# Patient Record
Sex: MALE | Race: WHITE | NOT HISPANIC OR LATINO | Employment: OTHER | URBAN - METROPOLITAN AREA
[De-identification: names, ages, dates, MRNs, and addresses within clinical notes are randomized per-mention and may not be internally consistent; named-entity substitution may affect disease eponyms.]

---

## 2020-02-25 ENCOUNTER — TRANSCRIBE ORDERS (OUTPATIENT)
Dept: ADMINISTRATIVE | Facility: HOSPITAL | Age: 85
End: 2020-02-25

## 2020-02-25 DIAGNOSIS — C61 PROSTATE CA (HCC): Primary | ICD-10-CM

## 2020-02-27 ENCOUNTER — TRANSCRIBE ORDERS (OUTPATIENT)
Dept: ADMINISTRATIVE | Facility: HOSPITAL | Age: 85
End: 2020-02-27

## 2020-02-27 DIAGNOSIS — C79.52 SECONDARY MALIGNANT NEOPLASM OF BONE AND BONE MARROW (HCC): Primary | ICD-10-CM

## 2020-02-27 DIAGNOSIS — C77.9 LYMPH NODE CANCER (HCC): ICD-10-CM

## 2020-02-27 DIAGNOSIS — C79.51 SECONDARY MALIGNANT NEOPLASM OF BONE AND BONE MARROW (HCC): Primary | ICD-10-CM

## 2020-02-27 DIAGNOSIS — M54.9 BACK PAIN, UNSPECIFIED BACK LOCATION, UNSPECIFIED BACK PAIN LATERALITY, UNSPECIFIED CHRONICITY: ICD-10-CM

## 2020-03-12 ENCOUNTER — APPOINTMENT (OUTPATIENT)
Dept: NUCLEAR MEDICINE | Facility: HOSPITAL | Age: 85
End: 2020-03-12

## 2020-03-12 ENCOUNTER — APPOINTMENT (OUTPATIENT)
Dept: CT IMAGING | Facility: HOSPITAL | Age: 85
End: 2020-03-12

## 2020-03-12 ENCOUNTER — HOSPITAL ENCOUNTER (OUTPATIENT)
Dept: PET IMAGING | Facility: HOSPITAL | Age: 85
End: 2020-03-12

## 2020-06-10 ENCOUNTER — HOSPITAL ENCOUNTER (OUTPATIENT)
Dept: NUCLEAR MEDICINE | Facility: HOSPITAL | Age: 85
Discharge: HOME | End: 2020-06-10

## 2020-06-10 ENCOUNTER — HOSPITAL ENCOUNTER (OUTPATIENT)
Dept: CT IMAGING | Facility: HOSPITAL | Age: 85
Discharge: HOME | End: 2020-06-10
Admitting: NURSE PRACTITIONER

## 2020-06-10 DIAGNOSIS — C79.52 SECONDARY MALIGNANT NEOPLASM OF BONE AND BONE MARROW (HCC): ICD-10-CM

## 2020-06-10 DIAGNOSIS — C79.51 SECONDARY MALIGNANT NEOPLASM OF BONE AND BONE MARROW (HCC): ICD-10-CM

## 2020-06-10 DIAGNOSIS — C77.9 LYMPH NODE CANCER (HCC): ICD-10-CM

## 2020-06-10 DIAGNOSIS — M54.9 BACK PAIN, UNSPECIFIED BACK LOCATION, UNSPECIFIED BACK PAIN LATERALITY, UNSPECIFIED CHRONICITY: ICD-10-CM

## 2020-06-10 LAB — CREAT BLDA-MCNC: 1.2 MG/DL (ref 0.6–1.3)

## 2020-06-10 PROCEDURE — 71260 CT THORAX DX C+: CPT

## 2020-06-10 PROCEDURE — 0 DIATRIZOATE MEGLUMINE & SODIUM PER 1 ML: Performed by: NURSE PRACTITIONER

## 2020-06-10 PROCEDURE — 78306 BONE IMAGING WHOLE BODY: CPT

## 2020-06-10 PROCEDURE — A9503 TC99M MEDRONATE: HCPCS | Performed by: NURSE PRACTITIONER

## 2020-06-10 PROCEDURE — 82565 ASSAY OF CREATININE: CPT

## 2020-06-10 PROCEDURE — 74177 CT ABD & PELVIS W/CONTRAST: CPT

## 2020-06-10 PROCEDURE — 0 TECHNETIUM MEDRONATE KIT: Performed by: NURSE PRACTITIONER

## 2020-06-10 PROCEDURE — 25010000002 IOPAMIDOL 61 % SOLUTION: Performed by: NURSE PRACTITIONER

## 2020-06-10 RX ORDER — TC 99M MEDRONATE 20 MG/10ML
22.1 INJECTION, POWDER, LYOPHILIZED, FOR SOLUTION INTRAVENOUS
Status: COMPLETED | OUTPATIENT
Start: 2020-06-10 | End: 2020-06-10

## 2020-06-10 RX ADMIN — DIATRIZOATE MEGLUMINE AND DIATRIZOATE SODIUM 30 ML: 600; 100 SOLUTION ORAL; RECTAL at 10:31

## 2020-06-10 RX ADMIN — Medication 22.1 MILLICURIE: at 10:00

## 2020-06-10 RX ADMIN — IOPAMIDOL 85 ML: 612 INJECTION, SOLUTION INTRAVENOUS at 11:30

## 2020-06-17 ENCOUNTER — HOSPITAL ENCOUNTER (EMERGENCY)
Facility: HOSPITAL | Age: 85
Discharge: HOME | End: 2020-06-17
Attending: EMERGENCY MEDICINE | Admitting: EMERGENCY MEDICINE

## 2020-06-17 VITALS
WEIGHT: 140 LBS | RESPIRATION RATE: 18 BRPM | TEMPERATURE: 96.7 F | HEART RATE: 69 BPM | HEIGHT: 64 IN | OXYGEN SATURATION: 97 % | BODY MASS INDEX: 23.9 KG/M2

## 2020-06-17 DIAGNOSIS — I10 HYPERTENSION NOT AT GOAL: Primary | ICD-10-CM

## 2020-06-17 PROCEDURE — 99282 EMERGENCY DEPT VISIT SF MDM: CPT

## 2020-06-17 NOTE — ED PROVIDER NOTES
EMERGENCY DEPARTMENT ENCOUNTER    Room Number:  17/17  Date seen:  6/17/2020  Time seen: 11:20 AM  PCP: Provider, No Known  Historian: patient      HPI:  Chief Complaint: elevated temperature reading    A complete HPI/ROS/PMH/PSH/SH/FH are unobtainable due to: none    Context: Luis Antonio Valdivia is a 89 y.o. male who presents to the ED for evaluation of a one-time elevated temperature reading.  This was 1 time just prior to arrival, mild,  when he had an appointment with his urologist to follow-up on some outpatient scans he had done.  He was sitting in his hot car waiting for staff to come out in triage him for his appointment due to the COVID-19 pandemic.  When he checked his temperature it was 100.0.  They swabbed him for COVID-19 and sent him to the ER for further evaluation.  He believes sitting in the hot care made his temperature higher, and sitting in the a/c since has allowed it to come back down.    He denies having any symptoms at all.  He feels great and at baseline.  He feels frustrated that he is been sent to the ER and would like to be discharged.  He does state that he was told he had a nonspecific lesion on his chest CT from a few days ago.  He was told it did not look infectious.  He has had no cough, fever, chills, nausea, vomiting, diarrhea, shortness of breath, abdominal pain or urinary symptoms.  He also denies anosmia, dysgeusia, and any known exposure to COVID 19.        PAST MEDICAL HISTORY  Active Ambulatory Problems     Diagnosis Date Noted   • No Active Ambulatory Problems     Resolved Ambulatory Problems     Diagnosis Date Noted   • No Resolved Ambulatory Problems     No Additional Past Medical History         PAST SURGICAL HISTORY  No past surgical history on file.      FAMILY HISTORY  No family history on file.      SOCIAL HISTORY  Social History     Socioeconomic History   • Marital status:      Spouse name: Not on file   • Number of children: Not on file   • Years of education:  Not on file   • Highest education level: Not on file         ALLERGIES  Patient has no known allergies.        REVIEW OF SYSTEMS  Review of Systems     All systems reviewed and negative except for those discussed in HPI.       PHYSICAL EXAM  ED Triage Vitals [06/17/20 1039]   Temp Heart Rate Resp BP SpO2   96.7 °F (35.9 °C) 69 18 -- 97 %      Temp src Heart Rate Source Patient Position BP Location FiO2 (%)   Tympanic Monitor -- -- --         GENERAL: well-appearing, not distressed  HENT: atraumatic  EYES: no scleral icterus  CV: regular rhythm, regular rate  RESPIRATORY: normal effort  ABDOMEN: soft, nontender  MUSCULOSKELETAL: no deformity  NEURO: alert, moves all extremities, follows commands  SKIN: warm, dry    Vital signs and nursing notes reviewed.            PROGRESS AND CONSULTS    DDX includes but not limited to temp elevation due to environment, covid 19, pneumonia, UTI,     ED Course as of Jun 17 2014 Wed Jun 17, 2020   1142 The patient is alert, oriented x3.  He is of sound mental capacity.  He feels his temperature was elevated simply because he was sitting in the hot car and it has come down since being in the conditioning.  He denies any infectious symptoms.  I have offered to contact the urology office because his scans are not available in epic.  He does not want to wait for his scan reports to be faxed over.  I have offered blood work, imaging of his chest and a urinalysis to further screen him for infection.  He declines.  He is asymptomatic and has no complaints and his temperature has normalized.  He would like to be discharged at this time.  I feel this is okay. He will return if he develops any symptoms.   His blood pressure is elevated however he forgot to take his medicine this morning and rushing to his an appointment.  He wants no further work-up for this and would like to take his medication when he gets home.  He has no chest pain or shortness of breath or headache and I feel this is  chato as well.    [KA]      ED Course User Index  [KA] Marivel Koenig PA        Reviewed pt's history and workup with Dr. Greer.  After a bedside evaluation; they agree with the plan of care      Patient was placed in face mask in first look. Patient was wearing facemask each time I entered the room and throughout our encounter. I wore  protective equipment throughout this patient encounter including a face mask, eye shield and gloves. Hand hygiene was performed before donning protective equipment and after removal when leaving the room.        DIAGNOSIS  Final diagnoses:   Hypertension not at goal               Latest Documented Vital Signs:  As of 11:44  BP-   HR- 69 Temp- 96.7 °F (35.9 °C) (Tympanic) O2 sat- 97%       Marivel Koenig PA  06/17/20 2014

## 2020-06-17 NOTE — ED PROVIDER NOTES
Pt presents to the ED c/o  sugar of 100 at the urologist office.  Patient states that he had a CT of the chest abdomen pelvis along with a PET scan last week.  He was waiting in his car without the air conditioning on to be seen by the urologist this morning.  When he arrived at the urologist office, they found out that his temperature was 100 °F.  Patient denies any symptoms.  He denies cough, anosmia, general body aches, shortness of breath, nausea or vomiting.  He states they swabbed him for SARS-CoV-2 and sent patient here for further evaluation.  Currently he has no symptoms and would like to go home.    Patient was placed in face mask in first look. Patient was wearing facemask when I entered the room and throughout our encounter. I wore full protective equipment throughout this patient encounter including a face mask and gloves. Hand hygiene was performed before donning protective equipment and after removal when leaving the room.       On exam,   His heart is regular rate and rhythm without murmurs.  His lungs are clear to auscultation bilaterally.  His neck is supple without any lymphadenopathy.  His abdomen is normoactive bowel sounds, soft, nontender nondistended.  His extremities reveal no calf tenderness.     Plan: Given that patient is asymptomatic and is already been swabbed for SARS-CoV-2, I have no problem with patient going home.  Keren has offered chest x-ray and lab work to rule out infection.  He politely declines and would like to go home.     Attestation:  The [facility] and I have discussed this patient's history, physical exam, and treatment plan.  I have reviewed the documentation and personally had a face to face interaction with the patient. I affirm the documentation and agree with the treatment and plan.  The attached note describes my personal findings.       Dragon disclaimer:   Much of this encounter note is an electronic transcription/translation of spoken language to printed text.  The  electronic translation of spoken language may permit erroneous, or at times, nonsensical words or phrases to be inadvertently transcribed.  Although I have reviewed the note for such areas, some may still exist.     John Greer MD  06/17/20 9218

## 2020-06-17 NOTE — ED TRIAGE NOTES
Pt arrives from [facility]. States that he had a CT of his chest and it showed a nodule on his lung and was sent for eval. Pt states that at [facility] they said he had a fever. Pt denies any fever at home and was sitting in his car for awhile before the appointment. Pt masked at arrival and triage staff wore all appropriate PPE.

## 2020-06-17 NOTE — DISCHARGE INSTRUCTIONS
Go home and take your blood pressure medication.   Take your temperature daily.   Return to the ER for fever >100.4, shortness of breath, chest pain, new urinary symptoms, weakness, any concerns.

## 2021-12-22 ENCOUNTER — APPOINTMENT (OUTPATIENT)
Dept: CT IMAGING | Facility: HOSPITAL | Age: 86
End: 2021-12-22

## 2021-12-22 ENCOUNTER — HOSPITAL ENCOUNTER (EMERGENCY)
Facility: HOSPITAL | Age: 86
Discharge: HOME | End: 2021-12-22
Attending: EMERGENCY MEDICINE | Admitting: EMERGENCY MEDICINE

## 2021-12-22 VITALS
OXYGEN SATURATION: 95 % | BODY MASS INDEX: 23.9 KG/M2 | RESPIRATION RATE: 16 BRPM | WEIGHT: 140 LBS | DIASTOLIC BLOOD PRESSURE: 94 MMHG | SYSTOLIC BLOOD PRESSURE: 145 MMHG | HEART RATE: 64 BPM | HEIGHT: 64 IN | TEMPERATURE: 98.7 F

## 2021-12-22 DIAGNOSIS — S09.90XA MINOR HEAD INJURY, INITIAL ENCOUNTER: Primary | ICD-10-CM

## 2021-12-22 DIAGNOSIS — S16.1XXA STRAIN OF NECK MUSCLE, INITIAL ENCOUNTER: ICD-10-CM

## 2021-12-22 DIAGNOSIS — S01.01XA LACERATION OF SCALP, INITIAL ENCOUNTER: ICD-10-CM

## 2021-12-22 PROCEDURE — 72125 CT NECK SPINE W/O DYE: CPT

## 2021-12-22 PROCEDURE — 99283 EMERGENCY DEPT VISIT LOW MDM: CPT

## 2021-12-22 PROCEDURE — 70450 CT HEAD/BRAIN W/O DYE: CPT

## 2021-12-22 NOTE — ED PROVIDER NOTES
" EMERGENCY DEPARTMENT ENCOUNTER    Room Number:  03/03  Date of encounter:  12/22/2021  PCP: Provider, No Known  Historian: Patient and EMS report      PPE    Patient was placed in face mask in first look. Patient was wearing facemask when I entered the room and throughout our encounter. I wore full protective equipment throughout this patient encounter including a N95 face mask, and gloves. Hand hygiene was performed before donning protective equipment and after removal when leaving the room.        HPI:  Chief Complaint: Fall  A complete HPI/ROS/PMH/PSH/SH/FH are unobtainable due to: Patient very hard of hearing    Context: Luis Antonio Valdivia is a 89 y.o. male who arrives to the ED via EMS from home where he lives with his wife.  Patient states he got up to do something when he just \" lost his gas\" and fell backwards.  He states he did hit the back of his head on the floor.  He denies LOC, states he does not have a headache, does not have any neck pain or back pain.  Patient denies symptoms prior to the fall including shortness of breath and chest pain.  Patient is extremely hard of hearing.      PAST MEDICAL HISTORY  Active Ambulatory Problems     Diagnosis Date Noted   • No Active Ambulatory Problems     Resolved Ambulatory Problems     Diagnosis Date Noted   • No Resolved Ambulatory Problems     No Additional Past Medical History         PAST SURGICAL HISTORY  No past surgical history on file.      FAMILY HISTORY  No family history on file.      SOCIAL HISTORY  Social History     Socioeconomic History   • Marital status:          ALLERGIES  Patient has no known allergies.        REVIEW OF SYSTEMS  Review of Systems     All systems reviewed and negative except for those discussed in HPI.        PHYSICAL EXAM    ED Triage Vitals   Temp Heart Rate Resp BP SpO2   12/22/21 0233 12/22/21 0233 12/22/21 0233 12/22/21 0233 12/22/21 0233   98.7 °F (37.1 °C) 68 16 (!) 195/105 95 %       Physical Exam  HENT:      " Head:         GENERAL: Elderly appearing, nontoxic appearing, not distressed  HENT: normocephalic,  EYES: no scleral icterus, PERRL, EOMI  CV: regular rhythm, regular rate, no murmur  RESPIRATORY: normal effort, CTAB  ABDOMEN: soft   MUSCULOSKELETAL: no deformity  No cervical, thoracic or lumbar vertebral tenderness to palpation  No step off or crepitus noted  No cervical, thoracic or lumbar paraspinal tenderness to palpation  Bilateral equal handgrips, moving lower extremities without difficulty  NEURO: alert, moves all extremities, follows commands, mental status normal/baseline  SKIN: warm, dry, no rash   Psych: Appropriate mood and affect  Nursing notes and vital signs reviewed      LAB RESULTS  No results found for this or any previous visit (from the past 24 hour(s)).    Ordered the above labs and independently reviewed the results.      RADIOLOGY  CT Head Without Contrast    Result Date: 12/22/2021  Patient: JONATHAN BOLAND  Time Out: 06:14 Exam(s): CT HEAD Without Contrast EXAM:   CT Head Without Intravenous Contrast CLINICAL HISTORY:    Reason for exam: Head trauma, minor (Age >= 65y). TECHNIQUE:   Axial computed tomography images of the head brain without intravenous contrast.  CTDI is 50.70 mGy and DLP is 970.8 mGy-cm.  This CT exam was performed according to the principle of ALARA (As Low As Reasonably Achievable) by using one or more of the following dose reduction techniques: automated exposure control, adjustment of the mA and or kV according to patient size, and or use of iterative reconstruction technique. COMPARISON:   No relevant prior studies available. FINDINGS: There is no acute intracranial hemorrhage or major vascular territory infarct. No mass effect or midline shift seen. There is prominence of the ventricles and sulci consistent with generalized parenchymal volume loss. Scattered hypodensities throughout the periventricular and subcortical white matter are noted, likely sequela of chronic  microvascular changes. The calvarium is intact. Bilateral lens replacements noted.  Mucosal thickening of the right maxillary sinus noted.  The mastoid air cells are clear.  IMPRESSION: No acute intracranial hemorrhage, mass-effect, edema or calvarial fracture. Generalized parenchymal volume loss and sequela of chronic microvascular ischemic changes.    Electronically signed by Umesh Brito M.D. on 12-22-21 at 0614    CT Cervical Spine Without Contrast    Result Date: 12/22/2021  Patient: JONATHAN BOLAND  Time Out: 06:20 Exam(s): CT C SPINE EXAM:   CT Cervical Spine Without Intravenous Contrast CLINICAL HISTORY:    Reason for exam: Neck trauma (Age >= 65y). TECHNIQUE:   Axial computed tomography images of the cervical spine without intravenous contrast.  CTDI is 10.4 mGy and DLP is 161.7 mGy-cm.  This CT exam was performed according to the principle of ALARA (As Low As Reasonably Achievable) by using one or more of the following dose reduction techniques: automated exposure control, adjustment of the mA and or kV according to patient size, and or use of iterative reconstruction technique. COMPARISON:   No relevant prior studies available. FINDINGS: No evidence of fracture or malalignment.  Postsurgical changes from C3-6 ACDF noted. Extensive multilevel cervical spondylosis noted. Moderate vascular calcification noted within the neck, suboptimally characterized on this noncontrast study The prevertebral and paraspinal soft tissues are otherwise grossly unremarkable. Emphysematous changes and pleural thickening noted at the right greater than left lung apices, suboptimally evaluated on this study IMPRESSION:     C3-6 ACDF without evidence of acute fracture malalignment. Extensive multilevel spondylitic changes.     Electronically signed by Umesh Brito M.D. on 12-22-21 at 0620      I ordered the above noted radiological studies and viewed the images on the PACS system.         MEDICAL RECORD REVIEW  Medical  records reviewed in epic, no relevant records for this ER visit      PROCEDURES    Laceration Repair    Date/Time: 12/22/2021 6:34 AM  Performed by: Suzanna Mcmanus APRN  Authorized by: Adi Gregory MD     Consent:     Consent obtained:  Verbal    Consent given by:  Patient    Risks discussed:  Infection    Alternatives discussed:  No treatment  Anesthesia (see MAR for exact dosages):     Anesthesia method:  None  Laceration details:     Location:  Scalp    Scalp location:  Occipital    Length (cm):  2  Repair type:     Repair type:  Simple  Treatment:     Area cleansed with:  Saline    Amount of cleaning:  Standard    Irrigation solution:  Sterile saline  Skin repair:     Repair method:  Staples    Number of staples:  2  Post-procedure details:     Dressing:  Open (no dressing)    Patient tolerance of procedure:  Tolerated well, no immediate complications            DIFFERENTIAL DIAGNOSIS  Differential Diagnosis for head injury include but are not limited to the following:  Cerebral contusion, Concussion ( with or without LOC), Head contusion, Skull fracture, orbital fracture, Hematoma, Intracranial Hemorrhage, Laceration, Post Concussion Syndrome.         PROGRESS, DATA ANALYSIS, CONSULTS, AND MEDICAL DECISION MAKING        ED Course as of 12/22/21 1450   Wed Dec 22, 2021   0635 Patient updated on normal CT of the head and C-spine.  Placed 2 staples in the small laceration to the back of his head, he tolerated that without difficulty.  Will have care management help with transportation to get him back to his apartment. [MS]   0636 Reviewed pt's history and workup with Dr. Gregory.  After a bedside evaluation, they agree with the plan of care.       [MS]   0828 Discussed with Osiris Garces CCP RN regarding transportation home for patient.  ELIAZAR Santiago has made phone calls to patient's son and wife.  If the son is unable to pick patient up we will attempt to get patient back in a wheelchair van. [MS]    0837   Imaging:  CT head: no acute abnormalites  CT C-spine: negative    Given history, exam and work-up low suspicious for intracranial hemorrhage, skull fracture, spine fracture or other acute spinal injuries, or extremity fracture.  Updated patient on imaging results.  Patient will be discharged home with strict return to ER precautions and instructions to follow-up with PMD to have staples removed in 7 days     [MS]      ED Course User Index  [MS] Suzanna Mcmanus APRN     Discussed plan for discharge, as there is no emergent indication for admission. Pt/family is agreeable and understands need for follow up and repeat testing.  Pt is aware that discharge does not mean that nothing is wrong but it indicates no emergency is present that requires admission and they must continue care with follow-up as given below or physician of their choice.   Patient/Family voiced understanding of above instructions.  Patient discharged in stable condition.    DIAGNOSIS  Final diagnoses:   Minor head injury, initial encounter   Strain of neck muscle, initial encounter   Laceration of scalp, initial encounter       FOLLOW UP   Your PMD    In 1 week  for staples      RX     Medication List      No changes were made to your prescriptions during this visit.           MEDICATIONS GIVEN IN ED    Medications - No data to display        COURSE & MEDICAL DECISION MAKING  Any/All labs and Any/All Imaging studies that were ordered were reviewed and are noted above.  Results were reviewed/discussed with the patient and they were also made aware of online access.    Pt also made aware that some labs, such as cultures, will not be resulted during ER visit and followup with PMD is necessary.        Suzanna Mcmanus, APRN  12/22/21 5519

## 2021-12-22 NOTE — ED NOTES
Pt fell trying to get into bed hit head on nightstand per EMS pt has about 1/2 in lac on back of head. No loc, no blood thinners.   Witnessed fall from wife      Chris Hooper, RN  12/22/21 0235       Chris Hooper, ELIAZAR  12/22/21 9880

## 2021-12-22 NOTE — DISCHARGE INSTRUCTIONS
Follow up with your primary care doctor within 48-72 hours. Rest. Take Acetaminophen (Tylenol) every 4-6 hours, as needed, for pain. Wash laceration daily with soap and water. Have staples removed in 7 days. Monitor for signs of infection-redness, drainage, fever or chills.  Often individuals develop a headache associated with nausea in the days/hours after a head injury. This is called a concussion and does not warrant a return to the ED UNLESS: you develop significant worsening of pain, profuse vomiting, dizziness, changes in vision, difficulty walking/speaking, weakness or numbness to your extremities.

## 2021-12-22 NOTE — ED PROVIDER NOTES
The [facility] and I have discussed this patients history, physical exam, and treatment plan. I have reviewed the documentation and personally had a face to face interaction with the patient. I affirm the documentation and agree with the treatment and plan.  The following note describes my personal findings    This patient is a 89-year-old male presenting to the emergency room following a fall at home this morning.  He states that he just became weak and then fell backwards striking his head.  He denies loss of consciousness or any complaints of head or neck pain.    Exam: This patient is resting comfortably and in no distress, without gross neurological deficit.  He is afebrile with stable vital signs and nontoxic in appearance.  On examination of his head does show a small laceration to the occipital portion of his scalp.  Neck is nontender and without step-off or deformity.    Plan: We will obtain a CT scan of the head as well as cervical spine.  We will clean his wound and repair appropriately.  We will monitor and reassess following.      The patient was wearing a facemask upon entrance into the room and remained in such throughout their visit.  I was wearing PPE including a facemask, eye protection, as well as gloves at any point entering the room and throughout the visit     Adi Gregory MD  12/23/21 5581